# Patient Record
(demographics unavailable — no encounter records)

---

## 2025-06-06 NOTE — HISTORY OF PRESENT ILLNESS
[FreeTextEntry1] : NPA- Rash on both feet  [de-identified] :  RUFINA HERMAN is a 27 year woman who presents for blisters on feet  Location: both feet Onset/Duration: 1 week notice at night, woke up with it; did note fever later in the feet. No contact with water, chemicals, pedicures etc Symptoms: its itchy and uncomfortable Treatments: triamcinolone cream bid, doxycycline hyclate. Had blood work done and no results of any virus.  Hx of childhood eczema - had triamcinolone for this Family history: no, reports no interaction with kids and no traveling in the recent week.  Auto immune conditions - no

## 2025-06-06 NOTE — PHYSICAL EXAM
[FreeTextEntry3] : Tense vesicles of varying sizes on lateral feet Few hyperpigmented macules right sole of foot Palms are clear

## 2025-06-06 NOTE — ASSESSMENT
[FreeTextEntry1] : #Blistering eruption of feet complicated by significant #Pruritus New condition with uncertain prognosis; differential diagnosis includes dyshidrotic eczema, hand foot mouth disease, acute allergic contact dermatitis, less likely autoimmune blistering disease -Start clobetasol ointment BID to AA x 2 weeks SED including atrophy, dyspigmentation, telangiectasias, striae. Proper use reviewed including only using to affected area and avoidance of prolonged use. Avoid direct contact with eyes. -Discussed sequelae of HFMD including onychomadesis and acral peeling, which are self-limited benign conditions  FU 2 weeks

## 2025-06-25 NOTE — HISTORY OF PRESENT ILLNESS
[FreeTextEntry1] : RPA- F/U on rash [de-identified] :  RUFIAN HERMAN is a 27-year F who presents for: f/u on rash/blisters on her feet.   #Rash  Location: feet and hands Onset/Duration: 3 weeks Symptoms: itchy Treatments: clobetasol, antibiotics  Family history: none Auto immune conditions none   Worsened since LV Developed new spots on hands Endorses scaling around blisters of feet which she exfloliated

## 2025-06-25 NOTE — ASSESSMENT
[FreeTextEntry1] : Suspect #Tinea pedis (bullous) complicated by #Id reaction Worsening condition with uncertain prognosis and duration DDx for #Acute blistering eruption - bullous tinea most likely, with dermatophyte id reaction on hands; less likely dyshidrotic eczema given lack of palmar involvement, dorsal hand predominance; less likely bullous pemphigoid given age, though a consideration; no evidence of scabies Discussed R/B empiric treatment v biopsy for H+E and DIF For now opt for medical trial - terbinafine 250 mg daily with foods; discussed risks of hepatotoxicity, worsening id reaction, nausea, abdominal pain, headaches; let us know if develop any side effects Will also prescribe ketoconazole cream BID to AA on feet Stop using clobetasol FU 2 weeks

## 2025-06-25 NOTE — PHYSICAL EXAM
[FreeTextEntry3] : tense vesicles on lateral feet, surrounding peeling scale small tense vesicles dorsal fingers, spares palms

## 2025-07-09 NOTE — HISTORY OF PRESENT ILLNESS
[FreeTextEntry1] : RPA- F/u on  [de-identified] :  Rodrigo Valadez 29 y/o F presents for f/u for rash/blisters on hands and feet - Pt states rash is getting worse and spreading. Nothing is helping and it is getting painful.

## 2025-07-09 NOTE — PHYSICAL EXAM
[FreeTextEntry3] : Tense vesicles, some coalescing, on lateral fingers and lateral plantar surfaces of foot

## 2025-07-09 NOTE — ASSESSMENT
[FreeTextEntry1] : #Acute blistering eruption #Other specified dermatitis -c/f autoimmune blistering disorder History/exam not c/w dyshidrotic eczema, allergic contact dermatitis, HFMD  Biopsy Procedure Note: Location: Right lateral foot (DIF), right medial foot (H+E) Differential: As above Discussed risk of pain, infection, bleeding, recurrence, need for further procedures. Verbal and written consent obtained from patient and family. The biopsy site(s) was(were) cleansed with an alcohol swab. I injected 3 cc of lidocaine 1% mixed with epinephrine 1:100,000 and utilized a 4 mm punch to obtain the biopsy. 4.0 Prolene suture x 4 simple interrupted was used for hemostasis. Patient tolerated procedure well.  #Pruritus Uncertain prognosis Start prednisone 60 mg daily x 2 weeks Discussed side effects including mood changes, increased appetite, insomnia, high blood pressure  FU 2 weeks

## 2025-07-23 NOTE — ASSESSMENT
[FreeTextEntry1] : #Bullous Tinea -Biopsy proven. Reviewed results with patient. Start terbinafine 250 mg daily x 30 days to ensure cure Discussed risk of headache, nausea, abdominal pain, dysgeusia. Rarely associated with fever and a rash, in which case should stop medication, notify derm, and present to ED if feeling ill. Start ketoconazole cream BID to AA x 30 days  FU 6-8 weeks

## 2025-07-23 NOTE — PHYSICAL EXAM
[FreeTextEntry3] : Hands are clear Fine scale on bilateral plantar feet Focal hyperkeratotic skin on edges of feet

## 2025-07-23 NOTE — HISTORY OF PRESENT ILLNESS
[FreeTextEntry1] : RPA- f/u on rash/blisters  [de-identified] :  RUFINA HERMAN is a 28-year F who presents for: F/u on rash/blisters on feet and hands.  Pt states the rash/blisters are way better mostly gone. Improved hours after taking prednisone the day of the last visit. Took for 10 days. Biopsy results c/w bullous tinea. Discussed results w patient and instructed to stop the prednisone and clobetasol. The terbinafine and ketoconazole creams will be delivered today